# Patient Record
Sex: MALE | Race: WHITE
[De-identification: names, ages, dates, MRNs, and addresses within clinical notes are randomized per-mention and may not be internally consistent; named-entity substitution may affect disease eponyms.]

---

## 2018-08-17 ENCOUNTER — HOSPITAL ENCOUNTER (OUTPATIENT)
Dept: HOSPITAL 44 - RAD | Age: 28
End: 2018-08-17
Attending: PHYSICIAN ASSISTANT
Payer: COMMERCIAL

## 2018-08-17 DIAGNOSIS — M79.645: Primary | ICD-10-CM

## 2018-08-17 PROCEDURE — 73140 X-RAY EXAM OF FINGER(S): CPT

## 2018-08-17 NOTE — DIAGNOSTIC IMAGING REPORT
YESENIA AMIN 

[facility]

[address] [facility] [address] [address]

 

 

 

 

Report Submission Date: Aug 17, 2018 2:54:24 PM CDT

Patient       Study

Name:   JOAN PABLO       Date:   Aug 17, 2018 2:27:28 PM CDT

MRN:   [id number]       Modality Type:   DX

Gender:   M       Description:   UPPER EXTREMITY

:   89       Institution:   [facility]

Physician:   YESENIA AMIN

     Accession:    [id number]

 

 

Left hand 



History:  Slammed finger in car door 



Three views of the left 4th digit were obtained which demonstrate no evidence 
for acute fracture or dislocation.  Mineralization and alignment is normal. 



Impression: 



No evidence for acute fracture or dislocation.

 

Electronically signed on Aug 17, 2018 2:54:24 PM CDT by:

Dotty WHITLEY